# Patient Record
Sex: MALE | Race: WHITE
[De-identification: names, ages, dates, MRNs, and addresses within clinical notes are randomized per-mention and may not be internally consistent; named-entity substitution may affect disease eponyms.]

---

## 2017-04-26 ENCOUNTER — HOSPITAL ENCOUNTER (OUTPATIENT)
Dept: HOSPITAL 41 - JD.SDS | Age: 47
Discharge: HOME | End: 2017-04-26
Attending: SURGERY
Payer: COMMERCIAL

## 2017-04-26 VITALS — DIASTOLIC BLOOD PRESSURE: 76 MMHG | SYSTOLIC BLOOD PRESSURE: 114 MMHG

## 2017-04-26 DIAGNOSIS — R19.4: ICD-10-CM

## 2017-04-26 DIAGNOSIS — M10.9: ICD-10-CM

## 2017-04-26 DIAGNOSIS — D12.5: Primary | ICD-10-CM

## 2017-04-26 DIAGNOSIS — K62.5: ICD-10-CM

## 2017-04-26 DIAGNOSIS — K21.0: ICD-10-CM

## 2017-04-26 DIAGNOSIS — Z87.19: ICD-10-CM

## 2017-04-26 DIAGNOSIS — K22.2: ICD-10-CM

## 2017-04-26 DIAGNOSIS — K22.10: ICD-10-CM

## 2017-04-26 DIAGNOSIS — Z83.71: ICD-10-CM

## 2017-04-26 DIAGNOSIS — Z88.1: ICD-10-CM

## 2017-04-26 PROCEDURE — 45385 COLONOSCOPY W/LESION REMOVAL: CPT

## 2017-04-26 PROCEDURE — 43239 EGD BIOPSY SINGLE/MULTIPLE: CPT

## 2017-04-26 PROCEDURE — 88305 TISSUE EXAM BY PATHOLOGIST: CPT

## 2017-04-26 PROCEDURE — 0DBN8ZX EXCISION OF SIGMOID COLON, VIA NATURAL OR ARTIFICIAL OPENING ENDOSCOPIC, DIAGNOSTIC: ICD-10-PCS | Performed by: SURGERY

## 2017-04-26 PROCEDURE — 0DB48ZX EXCISION OF ESOPHAGOGASTRIC JUNCTION, VIA NATURAL OR ARTIFICIAL OPENING ENDOSCOPIC, DIAGNOSTIC: ICD-10-PCS | Performed by: SURGERY

## 2017-04-26 NOTE — PCM.OPNOTE
- General Post-Op/Procedure Note


Date of Surgery/Procedure: 04/26/17


Operative Procedure(s): 1. EGD with GE junction biopsy x2 using cold forceps.  

2. Colonoscopy hot snare polypectomy


Findings: 





1. linear distal esophageal ulcer approximately 2 cm in length, and GE junction 

stricture


2. sigmoid polyp 


Pre Op Diagnosis: 1. Esophageal Dysphagia.  2. Personal and family history of 

multiple colon polyps, associated with changes in his bowel habits. Bright red 

blood per rectum.


Post-Op Diagnosis: 1. GERD complicated by reflux stricture, and single 

superficial linear ulceration.  2. Diminutive sigmoid polyp


Anesthesia Technique: MAC, Moderate sedation


Primary Surgeon: Ronen Yanez


Pathology: 





1. stricture biopsies x2


2. sigmoid polyp


EBL in mLs: 0


Complications: None


Condition: Good


Free Text/Narrative:: 





After adequate IV sedation and analgesia was obtained the patient was placed on 

his left side. Through a bite block a lubricated upper endoscope was inserted 

into the esophagus without difficulty. The scope was passed towards the GE 

junction, in which I found a 2 cm superficial linear ulcer and a GE junction 

stricture. The stricture was tight and did not allow passage of the scope 

despite firm pressure. Because of this I was about to abort the distal exam 

into the stomach and duodenum. I took 2 biopsies of the stricture at opposite 

sides of each other and this allowed the scope to pass into the stomach. The 

pylorus was entered to the second, and third parts of the duodenum. These areas 

were endoscopically normal with no inflammatory changes seen. The antrum and 

body of the stomach likewise were unremarkable. In the retroflexed view the 

fundus was normal as well. There was no hiatal hernia. However, the stricture 

could be seen from below. The rugal folds are grossly normal. The scope was 

withdrawn back to the GE junction and I photographed the superficial linear 

ulcer. There was no endoscopic Hernandez's change. The body of the esophagus was 

otherwise unremarkable. Representative photographs were taken for the patient's 

record.





Perianal inspection and digital rectal examination were performed and were 

unremarkable. The prostate was grossly normal. A lubricated colonoscope was 

inserted into the rectum then advanced under direct vision with air 

insufflation as necessary to the cecum. The bowel preparation was excellent. 

The cecum, right colon, transverse, and descending colons were endoscopically 

normal with no mass lesions or inflammatory changes seen. Within the distal 

sigmoid there was an 8 mm polyp which was removed with hot snare. The rectum 

was unremarkable in both views. Air was removed as I finished the procedure 

which he tolerated well. Representative photographs were taken for the record 

and for the patient.

## 2017-04-26 NOTE — PCM.PREANE
Preanesthetic Assessment





- Procedure


Proposed Procedure: 





EGD and colonoscopy 





- Anesthesia/Transfusion/Family Hx


Anesthesia History: Prior Anesthesia Without Reaction


Family History of Anesthesia Reaction: No


Transfusion History: No Prior Transfusion(s)


Intubation History: Unknown





- Review of Systems


General: No Symptoms


Pulmonary: No Symptoms


Cardiovascular: No Symptoms


Gastrointestinal: No symptoms


Neurological: No Symptoms


Other: Reports: None





- Physical Assessment


NPO Status Date: 04/25/17


NPO Status Time: 22:00


Pulse: 55


O2 Sat by Pulse Oximetry: 99


Respiratory Rate: 16


Blood Pressure: 129/76


Temperature: 36.4 C


Height: 1.8 m


ASA Class: 2


Mental Status: Alert & Oriented x3


Airway Class: Mallampati = 2


Dentition: Reports: Normal Dentition


Thyro-Mental Finger Breadths: 2


Mouth Opening Finger Breadths: 4


ROM/Head Extension: Full


Lungs: Clear to auscultation, Normal respiratory effort


Cardiovascular: Regular Rhythm, Bradycardia





- Allergies


Allergies/Adverse Reactions: 


 Allergies











Allergy/AdvReac Type Severity Reaction Status Date / Time


 


amoxicillin Allergy  Diarrhea Verified 03/28/17 10:25














- Blood


Blood Available: No





- Anesthesia Plan


Pre-Op Medication Ordered: None





- Acknowledgements


Anesthesia Type Planned: MAC


Pt an Appropriate Candidate for the Planned Anesthesia: Yes


Alternatives and Risks of Anesthesia Discussed w Pt/Guardian: Yes


Pt/Guardian Understands and Agrees with Anesthesia Plan: Yes





PreAnesthesia Questionnaire


Gastrointestinal History: Reports: Other (see below)


Other Gastrointestinal History: dysphagia, esophageal stricture


Musculoskeletal History: Reports: Gout





- Past Surgical History


HEENT Surgical History: Reports: Oral surgery, Other (see below)


Other HEENT Surgeries/Procedures: wisdom teeth


GI Surgical History: Reports: Hernia, inguinal





- SUBSTANCE USE


Smoking Status *Q: Never Smoker


Tobacco Use Within Last Twelve Months: Snuff/Dip (everyday)


Number of Drinks Per Day: 6


Recreational Drug Use History: No





- HOME MEDS


Home Medications: 


 Home Meds





Multivitamin [Multivitamins] 1 cap PO DAILY 04/25/17 [History]











- CURRENT (IN HOUSE) MEDS


Current Meds: 





 Current Medications





Lactated Ringer's (Ringers, Lactated)  1,000 mls @ 125 mls/hr IV ASDIRECTED JARETH


   Stop: 04/26/17 23:00


Lidocaine/Sodium Bicarbonate (Buffered Lidocaine 1% In Ns 8.4%)  0.25 ml IV 

ONETIME PRN


   PRN Reason: Prior to IV Start


   Stop: 04/26/17 18:00


Sodium Chloride (Saline Flush)  10 ml FLUSH ASDIRECTED PRN


   PRN Reason: Keep Vein Open


   Stop: 04/26/17 18:00

## 2018-02-16 ENCOUNTER — HOSPITAL ENCOUNTER (EMERGENCY)
Dept: HOSPITAL 41 - JD.ED | Age: 48
Discharge: HOME | End: 2018-02-16
Payer: COMMERCIAL

## 2018-02-16 VITALS — DIASTOLIC BLOOD PRESSURE: 109 MMHG | SYSTOLIC BLOOD PRESSURE: 169 MMHG

## 2018-02-16 DIAGNOSIS — Z79.899: ICD-10-CM

## 2018-02-16 DIAGNOSIS — Z88.1: ICD-10-CM

## 2018-02-16 DIAGNOSIS — M79.652: Primary | ICD-10-CM

## 2018-02-16 NOTE — EDM.PDOC
ED HPI GENERAL MEDICAL PROBLEM





- General


Chief Complaint: Lower Extremity Injury/Pain


Stated Complaint: SHARP PAIN INNER LEFT THIGH


Time Seen by Provider: 02/16/18 01:37


Source of Information: Reports: Patient


History Limitations: Reports: No Limitations





- History of Present Illness


INITIAL COMMENTS - FREE TEXT/NARRATIVE: 





The patient states that he developed fleeting sharp pain in his upper medial 

left thigh around 23:15. It lasted only about 15 seconds, but has recurred 

twice since. The patient has not identified any modifiers that will either 

produce or relieve the symptoms. He denies any recent injury to his left lower 

extremity. No prior similar symptoms.





The patient has not tried any home remedies.





The patient's PCP is Lena Woodson.








  ** Left Upper Mid-Anterior Leg


Pain Score (Numeric/FACES): 10





- Related Data


 Allergies











Allergy/AdvReac Type Severity Reaction Status Date / Time


 


amoxicillin Allergy  Diarrhea Verified 04/26/17 08:00











Home Meds: 


 Home Meds





Multivitamin [Multivitamins] 1 cap PO DAILY 04/25/17 [History]


Allopurinol [Zyloprim] 200 mg PO DAILY 02/16/18 [History]


Omeprazole 20 mg PO DAILY 02/16/18 [History]











Past Medical History


Cardiovascular History: Reports: Other (See Below) (Elevated triglycerides, 

untreated)


Gastrointestinal History: Reports: GERD (With esophageal stricture)


Musculoskeletal History: Reports: Gout (suspected, not confirmed)





- Past Surgical History


HEENT Surgical History: Reports: Oral Surgery ([place] teeth extraction)


GI Surgical History: Reports: Hernia, Inguinal (left)





Social & Family History





- Tobacco Use


Smoking Status *Q: Never Smoker


Tobacco Use Within Last Twelve Months: Smokeless Tobacco (Chews 1/3 tin per day)


Second Hand Smoke Exposure: No





- Caffeine Use


Caffeine Use: Reports: None





- Alcohol Use


Alcohol Use History: Yes


Alcohol Use Frequency: Socially





- Recreational Drug Use


Recreational Drug Use: No





- Living Situation & Occupation


Living situation: Reports: , with Spouse


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





Review of Systems





- Review of Systems


Review Of Systems: ROS reveals no pertinent complaints other than HPI.





ED EXAM, GENERAL





- Physical Exam


Exam: See Below


Exam Limited By: No Limitations


General Appearance: Alert, WD/WN, No Apparent Distress


Extremities: Other (No visible abnormality to the upper medial thigh, such as 

swelling, erythema, ecchymosis, or abrasion. The patient is able to identify 

the site where he had pain earlier, but he states that the area is nontender to 

palpation at this time. Neurovascular status of the left lower extremity is 

intact.)





Course





- Vital Signs


Last Recorded V/S: 


 Last Vital Signs











Temp  36.3 C   02/16/18 01:06


 


Pulse  79   02/16/18 01:06


 


Resp  18   02/16/18 01:06


 


BP  169/109 H  02/16/18 01:06


 


Pulse Ox  99   02/16/18 01:06














- Orders/Labs/Meds


Labs: 


 Laboratory Tests











  02/16/18 Range/Units





  02:05 


 


D-Dimer, Quantitative  < 0.19 L  (0.19-0.59)  mg/L














- Re-Assessments/Exams


Free Text/Narrative Re-Assessment/Exam: 





02/16/18 03:10


Test results discussed with the patient. His D-dimer is undetectably low, which 

essentially rules out a DVT. I do not know the cause of the patient's pain, but 

it does not appear to be anything serious. I am recommending that he follow up 

with his PCP if his symptoms persist.





Departure





- Departure


Time of Disposition: 03:11


Disposition: Home, Self-Care 01


Condition: Good


Clinical Impression: 


 Pain of left lower extremity








- Discharge Information


Referrals: 


Lena Woodson, NP [Primary Care Provider] - 


Forms:  ED Department Discharge


Additional Instructions: 


You were seen in the emergency room for sharp intermittent pain in your upper 

inner left thigh.





Workup in the ER included a D-dimer (a test for a blood clot), which returned 

negative.





The cause of your pain is unclear, but it does not appear to be due to 

something serious.





If your symptoms persist, please follow-up with your PCP, Lena Woodson.





If any other problems, please do not hesitate to return to the ER.

## 2023-08-03 ENCOUNTER — HOSPITAL ENCOUNTER (OUTPATIENT)
Dept: HOSPITAL 41 - JD.SDS | Age: 53
Discharge: HOME | End: 2023-08-03
Attending: SPECIALIST
Payer: COMMERCIAL

## 2023-08-03 VITALS — HEART RATE: 50 BPM | DIASTOLIC BLOOD PRESSURE: 72 MMHG | SYSTOLIC BLOOD PRESSURE: 108 MMHG

## 2023-08-03 DIAGNOSIS — K21.9: ICD-10-CM

## 2023-08-03 DIAGNOSIS — E78.1: ICD-10-CM

## 2023-08-03 DIAGNOSIS — K57.30: ICD-10-CM

## 2023-08-03 DIAGNOSIS — K44.9: ICD-10-CM

## 2023-08-03 DIAGNOSIS — M10.9: ICD-10-CM

## 2023-08-03 DIAGNOSIS — Z12.11: Primary | ICD-10-CM

## 2023-08-03 DIAGNOSIS — K22.2: ICD-10-CM

## 2023-08-03 DIAGNOSIS — K22.81: ICD-10-CM

## 2023-08-03 DIAGNOSIS — D12.5: ICD-10-CM

## 2023-08-03 DIAGNOSIS — Z88.0: ICD-10-CM

## 2023-08-03 DIAGNOSIS — Z79.899: ICD-10-CM

## 2023-08-03 DIAGNOSIS — Z72.0: ICD-10-CM

## 2023-08-03 DIAGNOSIS — K31.7: ICD-10-CM

## 2023-08-03 PROCEDURE — 43239 EGD BIOPSY SINGLE/MULTIPLE: CPT

## 2023-08-03 PROCEDURE — 45385 COLONOSCOPY W/LESION REMOVAL: CPT

## 2023-08-03 PROCEDURE — 43251 EGD REMOVE LESION SNARE: CPT
